# Patient Record
Sex: MALE | Race: WHITE | NOT HISPANIC OR LATINO
[De-identification: names, ages, dates, MRNs, and addresses within clinical notes are randomized per-mention and may not be internally consistent; named-entity substitution may affect disease eponyms.]

---

## 2019-06-11 PROBLEM — Z00.00 ENCOUNTER FOR PREVENTIVE HEALTH EXAMINATION: Status: ACTIVE | Noted: 2019-06-11

## 2019-06-13 PROBLEM — Z00.00 ENCOUNTER FOR PREVENTIVE HEALTH EXAMINATION: Status: ACTIVE | Noted: 2019-06-13

## 2019-06-14 ENCOUNTER — RESULT REVIEW (OUTPATIENT)
Age: 84
End: 2019-06-14

## 2019-06-14 ENCOUNTER — APPOINTMENT (OUTPATIENT)
Dept: CARDIOLOGY | Facility: CLINIC | Age: 84
End: 2019-06-14
Payer: MEDICARE

## 2019-06-14 VITALS
DIASTOLIC BLOOD PRESSURE: 60 MMHG | SYSTOLIC BLOOD PRESSURE: 110 MMHG | BODY MASS INDEX: 30.07 KG/M2 | WEIGHT: 203 LBS | HEIGHT: 69 IN

## 2019-06-14 DIAGNOSIS — Z87.891 PERSONAL HISTORY OF NICOTINE DEPENDENCE: ICD-10-CM

## 2019-06-14 DIAGNOSIS — Z82.49 FAMILY HISTORY OF ISCHEMIC HEART DISEASE AND OTHER DISEASES OF THE CIRCULATORY SYSTEM: ICD-10-CM

## 2019-06-14 LAB — INR PPP: 3.1 RATIO

## 2019-06-14 PROCEDURE — 85610 PROTHROMBIN TIME: CPT | Mod: QW

## 2019-06-14 PROCEDURE — 99204 OFFICE O/P NEW MOD 45 MIN: CPT

## 2019-06-14 RX ORDER — LOVASTATIN 40 MG/1
40 TABLET ORAL
Refills: 0 | Status: ACTIVE | COMMUNITY

## 2019-06-14 RX ORDER — DULOXETINE HYDROCHLORIDE 30 MG/1
30 CAPSULE, DELAYED RELEASE PELLETS ORAL
Refills: 0 | Status: ACTIVE | COMMUNITY

## 2019-06-14 RX ORDER — LOSARTAN POTASSIUM 25 MG/1
25 TABLET, FILM COATED ORAL DAILY
Refills: 0 | Status: DISCONTINUED | COMMUNITY
End: 2019-06-14

## 2019-06-14 RX ORDER — FUROSEMIDE 80 MG/1
80 TABLET ORAL DAILY
Refills: 0 | Status: ACTIVE | COMMUNITY

## 2019-06-14 NOTE — HISTORY OF PRESENT ILLNESS
[FreeTextEntry1] : This is an 83 year old male with history of heart failure with reduced EF (non-ischemic), aortic valve stenosis s/p bioprosthetic aortic valve replacement, chronic atrial fibrillation, presents after being discharged home on Sunday from [facility] for heart failure decompensation. Patient received IV Lasix and improved. He was discharged home and he has been feeling well. He currently denies any angina or palpitations. He has mild dyspnea on exertion. He did suffer a fall about 3 weeks ago, while attending his grand daughter's graduation. He broke ribs on the left which has been very painful and limiting his breathing. He states his pain is improving and he is able to take bigger breaths in now. He is compliant with medications and reports no adverse effects.

## 2019-06-14 NOTE — PHYSICAL EXAM
[General Appearance - Well Developed] : well developed [Normal Appearance] : normal appearance [Well Groomed] : well groomed [General Appearance - Well Nourished] : well nourished [General Appearance - In No Acute Distress] : no acute distress [Normal Conjunctiva] : the conjunctiva exhibited no abnormalities [Eyelids - No Xanthelasma] : the eyelids demonstrated no xanthelasmas [No Oral Pallor] : no oral pallor [Normal Oral Mucosa] : normal oral mucosa [No Oral Cyanosis] : no oral cyanosis [Exaggerated Use Of Accessory Muscles For Inspiration] : no accessory muscle use [Respiration, Rhythm And Depth] : normal respiratory rhythm and effort [Abdomen Soft] : soft [Abdomen Tenderness] : non-tender [Nail Clubbing] : no clubbing of the fingernails [Cyanosis, Localized] : no localized cyanosis [Skin Turgor] : normal skin turgor [] : no rash [Affect] : the affect was normal [Impaired Insight] : insight and judgment were intact [Memory Recent] : recent memory was not impaired [FreeTextEntry1] : ambulates with walker

## 2019-06-14 NOTE — REVIEW OF SYSTEMS
[Shortness Of Breath] : shortness of breath [Feeling Fatigued] : feeling fatigued [Dyspnea on exertion] : dyspnea during exertion [Lower Ext Edema] : lower extremity edema [Joint Pain] : joint pain [Joint Stiffness] : joint stiffness [Negative] : Heme/Lymph [Chest  Pressure] : no chest pressure [Palpitations] : no palpitations

## 2019-06-14 NOTE — DISCUSSION/SUMMARY
[FreeTextEntry1] : 1. Heart Failure with reduced EF: non-ischemic dilated cardiomyopathy. It appears patient wasn't optimized on appropriate HF medications prior to his hospitalization at [facility]. I would recommend discontinuing Losartan. I will prescribe Entresto 24/26mg BID. I spoke with patient about waiting 36 hours after stopping the Losartan before starting the Entresto. He should continue the Coreg 3.125mg BID, which was just started during his hospitalization. The goal going forward is to prevent HF admissions. I explained to patient that we do this by optimizing medications and him making lifestyle modifications (low salt diet, daily weights, flexibility with his diuretic therapy). I asked patient to get BMP done in two weeks after starting the Entresto and following up with me in 3 weeks to determine if Entresto can be increased. Over the course of the next 3-6 months I will continue to maximize his medical therapy for HF. Once he is on maximally tolerated dosages of beta blocker and Entersto, I will repeat an echocardiogram. If LV EF is still <35%, I will refer to EP for ICD.\par \par 2. Chronic Atrial Fibrillation: rate controlled. Patient on coumadin given history of bioprosthetic AV replacement. He has been taking Coumadin 5mg daily. Today his INR was 3.1. I am recommending Coumadin 5mg Mon-Sat and 2.5mg on Sunday. He should get INR checked in 1 week.\par \par 3. Coronary Artery Disease: mild on cath in 2014. On ARB and statin therapy.\par \par 4. Severe Valvular Disease/Pulmonary HTN: optimize HF medications. Repeat echocardiogram once HF medications optimized to determine if there is any improvement. \par \par Patient can follow up with me in 3 weeks .

## 2019-06-24 ENCOUNTER — APPOINTMENT (OUTPATIENT)
Dept: CARDIOLOGY | Facility: CLINIC | Age: 84
End: 2019-06-24
Payer: MEDICARE

## 2019-06-24 LAB — INR PPP: 3.3 RATIO

## 2019-06-24 PROCEDURE — 85610 PROTHROMBIN TIME: CPT | Mod: QW

## 2019-06-26 ENCOUNTER — MEDICATION RENEWAL (OUTPATIENT)
Age: 84
End: 2019-06-26

## 2019-07-01 ENCOUNTER — APPOINTMENT (OUTPATIENT)
Dept: CARDIOLOGY | Facility: CLINIC | Age: 84
End: 2019-07-01
Payer: MEDICARE

## 2019-07-01 LAB — INR PPP: 1.9 RATIO

## 2019-07-01 PROCEDURE — 85610 PROTHROMBIN TIME: CPT | Mod: QW

## 2019-07-09 ENCOUNTER — APPOINTMENT (OUTPATIENT)
Dept: CARDIOLOGY | Facility: CLINIC | Age: 84
End: 2019-07-09
Payer: MEDICARE

## 2019-07-09 LAB — INR PPP: 1.6 RATIO

## 2019-07-09 PROCEDURE — 85610 PROTHROMBIN TIME: CPT | Mod: QW

## 2019-07-19 ENCOUNTER — APPOINTMENT (OUTPATIENT)
Dept: CARDIOLOGY | Facility: CLINIC | Age: 84
End: 2019-07-19

## 2019-07-25 ENCOUNTER — APPOINTMENT (OUTPATIENT)
Dept: CARDIOLOGY | Facility: CLINIC | Age: 84
End: 2019-07-25
Payer: MEDICARE

## 2019-07-25 ENCOUNTER — RESULT REVIEW (OUTPATIENT)
Age: 84
End: 2019-07-25

## 2019-07-25 VITALS
SYSTOLIC BLOOD PRESSURE: 122 MMHG | HEART RATE: 62 BPM | BODY MASS INDEX: 29.77 KG/M2 | DIASTOLIC BLOOD PRESSURE: 60 MMHG | HEIGHT: 69 IN | WEIGHT: 201 LBS | OXYGEN SATURATION: 96 %

## 2019-07-25 DIAGNOSIS — N18.3 CHRONIC KIDNEY DISEASE, STAGE 3 (MODERATE): ICD-10-CM

## 2019-07-25 DIAGNOSIS — I50.42 CHRONIC COMBINED SYSTOLIC (CONGESTIVE) AND DIASTOLIC (CONGESTIVE) HEART FAILURE: ICD-10-CM

## 2019-07-25 DIAGNOSIS — Z79.899 OTHER LONG TERM (CURRENT) DRUG THERAPY: ICD-10-CM

## 2019-07-25 DIAGNOSIS — Z79.01 LONG TERM (CURRENT) USE OF ANTICOAGULANTS: ICD-10-CM

## 2019-07-25 DIAGNOSIS — I07.1 RHEUMATIC TRICUSPID INSUFFICIENCY: ICD-10-CM

## 2019-07-25 DIAGNOSIS — Z95.3 PRESENCE OF XENOGENIC HEART VALVE: ICD-10-CM

## 2019-07-25 DIAGNOSIS — I27.20 PULMONARY HYPERTENSION, UNSPECIFIED: ICD-10-CM

## 2019-07-25 DIAGNOSIS — I34.0 NONRHEUMATIC MITRAL (VALVE) INSUFFICIENCY: ICD-10-CM

## 2019-07-25 DIAGNOSIS — I48.2 CHRONIC ATRIAL FIBRILLATION: ICD-10-CM

## 2019-07-25 LAB — INR PPP: 2.3 RATIO

## 2019-07-25 PROCEDURE — 85610 PROTHROMBIN TIME: CPT | Mod: QW

## 2019-07-25 PROCEDURE — 99215 OFFICE O/P EST HI 40 MIN: CPT

## 2019-07-25 RX ORDER — WARFARIN 3 MG/1
3 TABLET ORAL DAILY
Qty: 90 | Refills: 0 | Status: DISCONTINUED | COMMUNITY
Start: 1900-01-01 | End: 2019-07-25

## 2019-07-25 RX ORDER — HYDROMORPHONE HYDROCHLORIDE 2 MG/1
2 TABLET ORAL
Qty: 14 | Refills: 0 | Status: ACTIVE | COMMUNITY
Start: 2019-05-21

## 2019-07-25 RX ORDER — IRON PS COMPLEX/B12/FOLIC ACID 150-25-1
150-25-1 CAPSULE ORAL
Qty: 90 | Refills: 0 | Status: ACTIVE | COMMUNITY
Start: 2019-03-01

## 2019-07-25 RX ORDER — WARFARIN 5 MG/1
5 TABLET ORAL
Qty: 90 | Refills: 0 | Status: DISCONTINUED | COMMUNITY
Start: 2018-12-12 | End: 2019-07-25

## 2019-07-25 RX ORDER — GLUCOSAMINE HCL/CHONDROITIN SU 500-400 MG
3 CAPSULE ORAL
Refills: 0 | Status: ACTIVE | COMMUNITY

## 2019-07-25 RX ORDER — CEFUROXIME AXETIL 500 MG/1
500 TABLET ORAL
Qty: 14 | Refills: 0 | Status: ACTIVE | COMMUNITY
Start: 2019-05-13

## 2019-07-25 NOTE — REVIEW OF SYSTEMS
[Feeling Fatigued] : feeling fatigued [Shortness Of Breath] : shortness of breath [Dyspnea on exertion] : dyspnea during exertion [Lower Ext Edema] : lower extremity edema [Joint Pain] : joint pain [Joint Stiffness] : joint stiffness [Negative] : Heme/Lymph [Chest  Pressure] : no chest pressure [Palpitations] : no palpitations

## 2019-07-25 NOTE — DISCUSSION/SUMMARY
[FreeTextEntry1] : Dereje is an 83-year-old male with medical history detailed above and active medical issues including:\par \par - Compensated HFrEF LVEF 20-25%, severe pulmonary hypertension, severe MR and TR echo [facility] June 2019.  Excellent diuresis improved dyspnea and edema on Lasix, Entresto, medication noncompliance with Coreg restarted.  Reassess LVEF 3 months and if less than 35% discuss ICD implant. \par \par - BioAVR [facility] March 2014, normal LVEF at that time.\par \par - AF on Coumadin.  Patient requests change to Eliquis.  INR checked today 2.3, Coumadin stopped, when INR less than 2.0 start renal low-dose Eliquis 2.5 mg twice daily.\par \par - Type 2 diabetes management with PCP\par \par - Peripheral neuropathy foot pain\par \par - Mechanical fall rib fracture May 2019\par \par Advised patient to follow active lifestyle with regular cardiovascular exercise. Patient educated on lifestyle and diet modification with low sodium low fat diet and avoidance of excessive alcohol. Patient is aware to call with any symptoms or concerns. \par \par Cardiology followup 3 months. \par \par Dereje will followup with Dr Zain Cano for primary care.

## 2019-07-25 NOTE — REASON FOR VISIT
[Follow-Up - Clinic] : a clinic follow-up of [FreeTextEntry2] : HFrEF, KIDD, edema, nonischemic cardiomyopathy, bioAVR, severe MR/TR/PH,  AF on coumadin [FreeTextEntry1] : Dereje is a 83-year-old male with history of flash pulmonary edema admission March 2014,  bioprosthetic AVR for AS, nonobstructive catheterization LVEF 60% March 2014 [facility], nonischemic cardiomyopathy LVEF 35%, moderate pulmonary hypertension echo July 2014, LVEF 20-25% severe pulmonary hypertension, severe MR,TR echo June 2019, chronic atrial fibrillation on Coumadin, type 2 diabetes, peripheral neuropathy foot pain, mechanical fall rib fracture May 2019. \par \par Admission [facility] 6/9/19 acute on chronic HFrEF seen in office followup by cardiologist Dr Ratliff 6/14/19 started on Entresto 24/26 and Coreg 3.125mg twice daily. \par \par Dyspnea and edema has significantly improved.  Patient has lost 15 pounds on Lasix 80 mg daily.  Patient states he ran out of Coreg 2 weeks ago.  Stressed the importance of medication compliance.  Coreg restarted. \par \par Cardiovascular review of symptoms is negative for exertional chest pain, dyspnea, palpitations, dizziness or syncope.  No PND or orthopnea leg edema.  No bleeding or black stool. \par \par Patient is exercising in the gym elliptical machine 15 minutes without exertional symptoms.\par

## 2019-07-25 NOTE — PHYSICAL EXAM
[General Appearance - Well Developed] : well developed [Normal Appearance] : normal appearance [Well Groomed] : well groomed [General Appearance - Well Nourished] : well nourished [General Appearance - In No Acute Distress] : no acute distress [Normal Conjunctiva] : the conjunctiva exhibited no abnormalities [Eyelids - No Xanthelasma] : the eyelids demonstrated no xanthelasmas [Normal Oral Mucosa] : normal oral mucosa [No Oral Pallor] : no oral pallor [No Oral Cyanosis] : no oral cyanosis [Respiration, Rhythm And Depth] : normal respiratory rhythm and effort [Exaggerated Use Of Accessory Muscles For Inspiration] : no accessory muscle use [Abdomen Soft] : soft [Abdomen Tenderness] : non-tender [Nail Clubbing] : no clubbing of the fingernails [Cyanosis, Localized] : no localized cyanosis [Skin Turgor] : normal skin turgor [] : no rash [Impaired Insight] : insight and judgment were intact [Affect] : the affect was normal [Memory Recent] : recent memory was not impaired [FreeTextEntry1] : ambulates with walker

## 2019-07-26 ENCOUNTER — APPOINTMENT (OUTPATIENT)
Dept: CARDIOLOGY | Facility: CLINIC | Age: 84
End: 2019-07-26
Payer: MEDICARE

## 2019-07-26 LAB — INR PPP: 1.8 RATIO

## 2019-07-26 PROCEDURE — 85610 PROTHROMBIN TIME: CPT | Mod: QW

## 2019-08-06 ENCOUNTER — APPOINTMENT (OUTPATIENT)
Dept: CARDIOLOGY | Facility: CLINIC | Age: 84
End: 2019-08-06

## 2019-10-14 ENCOUNTER — RX RENEWAL (OUTPATIENT)
Age: 84
End: 2019-10-14

## 2019-12-12 ENCOUNTER — MEDICATION RENEWAL (OUTPATIENT)
Age: 84
End: 2019-12-12

## 2020-01-15 ENCOUNTER — RX RENEWAL (OUTPATIENT)
Age: 85
End: 2020-01-15

## 2020-03-11 ENCOUNTER — RX RENEWAL (OUTPATIENT)
Age: 85
End: 2020-03-11

## 2020-07-23 RX ORDER — APIXABAN 2.5 MG/1
2.5 TABLET, FILM COATED ORAL
Qty: 180 | Refills: 1 | Status: DISCONTINUED | COMMUNITY
Start: 2019-07-25 | End: 2020-07-23

## 2020-07-23 RX ORDER — APIXABAN 2.5 MG/1
2.5 TABLET, FILM COATED ORAL
Qty: 60 | Refills: 0 | Status: ACTIVE | COMMUNITY
Start: 2020-01-15 | End: 1900-01-01

## 2020-08-24 RX ORDER — CARVEDILOL 3.12 MG/1
3.12 TABLET, FILM COATED ORAL
Qty: 60 | Refills: 0 | Status: ACTIVE | COMMUNITY
Start: 1900-01-01 | End: 1900-01-01

## 2020-08-24 RX ORDER — SACUBITRIL AND VALSARTAN 24; 26 MG/1; MG/1
24-26 TABLET, FILM COATED ORAL
Qty: 60 | Refills: 0 | Status: ACTIVE | COMMUNITY
Start: 2019-06-14 | End: 1900-01-01

## 2020-08-26 ENCOUNTER — APPOINTMENT (OUTPATIENT)
Dept: CARDIOLOGY | Facility: CLINIC | Age: 85
End: 2020-08-26

## 2020-09-16 ENCOUNTER — RX CHANGE (OUTPATIENT)
Age: 85
End: 2020-09-16

## 2020-09-28 ENCOUNTER — RX RENEWAL (OUTPATIENT)
Age: 85
End: 2020-09-28

## 2022-08-13 ENCOUNTER — NON-APPOINTMENT (OUTPATIENT)
Age: 87
End: 2022-08-13